# Patient Record
Sex: MALE | Race: WHITE | NOT HISPANIC OR LATINO
[De-identification: names, ages, dates, MRNs, and addresses within clinical notes are randomized per-mention and may not be internally consistent; named-entity substitution may affect disease eponyms.]

---

## 2019-06-19 ENCOUNTER — APPOINTMENT (OUTPATIENT)
Dept: CARDIOLOGY | Facility: CLINIC | Age: 34
End: 2019-06-19
Payer: COMMERCIAL

## 2019-06-19 ENCOUNTER — NON-APPOINTMENT (OUTPATIENT)
Age: 34
End: 2019-06-19

## 2019-06-19 VITALS
HEART RATE: 73 BPM | HEIGHT: 68 IN | DIASTOLIC BLOOD PRESSURE: 87 MMHG | WEIGHT: 237 LBS | SYSTOLIC BLOOD PRESSURE: 128 MMHG | BODY MASS INDEX: 35.92 KG/M2 | OXYGEN SATURATION: 97 %

## 2019-06-19 DIAGNOSIS — R07.9 CHEST PAIN, UNSPECIFIED: ICD-10-CM

## 2019-06-19 DIAGNOSIS — R06.02 SHORTNESS OF BREATH: ICD-10-CM

## 2019-06-19 PROCEDURE — 99204 OFFICE O/P NEW MOD 45 MIN: CPT

## 2019-06-19 PROCEDURE — 93000 ELECTROCARDIOGRAM COMPLETE: CPT

## 2019-06-19 NOTE — PHYSICAL EXAM
[General Appearance - Well Developed] : well developed [Normal Appearance] : normal appearance [Well Groomed] : well groomed [General Appearance - Well Nourished] : well nourished [No Deformities] : no deformities [General Appearance - In No Acute Distress] : no acute distress [Normal Conjunctiva] : the conjunctiva exhibited no abnormalities [Normal Oral Mucosa] : normal oral mucosa [Normal Jugular Venous A Waves Present] : normal jugular venous A waves present [Normal Jugular Venous V Waves Present] : normal jugular venous V waves present [No Jugular Venous Szymanski A Waves] : no jugular venous szymanski A waves [Not Palpable] : not palpable [Normal Rate] : normal [Normal S1] : normal S1 [Normal S2] : normal S2 [No Murmur] : no murmurs heard [2+] : left 2+ [No Abnormalities] : the abdominal aorta was not enlarged and no bruit was heard [No Pitting Edema] : no pitting edema present [Respiration, Rhythm And Depth] : normal respiratory rhythm and effort [Exaggerated Use Of Accessory Muscles For Inspiration] : no accessory muscle use [Auscultation Breath Sounds / Voice Sounds] : lungs were clear to auscultation bilaterally [Bowel Sounds] : normal bowel sounds [Abdomen Soft] : soft [Abdomen Tenderness] : non-tender [Abnormal Walk] : normal gait [Gait - Sufficient For Exercise Testing] : the gait was sufficient for exercise testing [Nail Clubbing] : no clubbing of the fingernails [Cyanosis, Localized] : no localized cyanosis [Skin Color & Pigmentation] : normal skin color and pigmentation [Skin Turgor] : normal skin turgor [] : no rash [Oriented To Time, Place, And Person] : oriented to person, place, and time [Impaired Insight] : insight and judgment were intact [No Anxiety] : not feeling anxious [S3] : no S3 [S4] : no S4 [Right Carotid Bruit] : no bruit heard over the right carotid [Left Carotid Bruit] : no bruit heard over the left carotid [Right Femoral Bruit] : no bruit heard over the right femoral artery [Left Femoral Bruit] : no bruit heard over the left femoral artery

## 2019-06-19 NOTE — HISTORY OF PRESENT ILLNESS
[FreeTextEntry1] : I saw Rj Murillo in the office today for cardiac evaluation. He is a 34-year-old white male who has been in good general health. He plays a lot of sports including football and hockey. He denies any exertional symptoms. He has no history of smoking, diabetes, hypertension, or hyperlipidemia. 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 He is scheduled to take a test tomorrow to join the union. 3 and 2 nights ago, he woke up from sleep after about 2 hours with a dull discomfort in his left anterior chest associated with some mild shortness of breath. The pain was not pleuritic and not sore to the touch. He did not feel better sitting up but the symptoms quickly subsided within a half minute and went back to sleep and felt well the next day. He did not have the symptoms last night..\par \par He denies any recent cold or infection. Had no cough. \par \par He takes no medication. He otherwise has been feeling healthy and normal.\par \par A resting 12-lead electrocardiogram demonstrates sinus rhythm and appears to be normal.

## 2019-06-19 NOTE — REVIEW OF SYSTEMS
[Shortness Of Breath] : shortness of breath [Chest Pain] : chest pain [Dizziness] : dizziness [Anxiety] : anxiety [Negative] : Respiratory [Fever] : no fever [Headache] : no headache [Chills] : no chills [Feeling Fatigued] : not feeling fatigued [Blurry Vision] : no blurred vision [Seeing Double (Diplopia)] : no diplopia [Palpitations] : no palpitations [Joint Pain] : no joint pain [Muscle Cramps] : no muscle cramps [Skin: A Rash] : no rash: [Depression] : no depression [Excessive Thirst] : no polydipsia [Easy Bleeding] : no tendency for easy bleeding [Easy Bruising] : no tendency for easy bruising

## 2019-06-19 NOTE — DISCUSSION/SUMMARY
[FreeTextEntry1] : Patient presents with very atypical chest pain and shortness of breath. The symptoms wake him from sleep and resolved quickly within a half a minute without any treatment. He's had the symptoms for 2 nights. During the day he is physically active and has no exertional symptoms. He's been under a lot of stress at work which is culminating in taking a test tomorrow to join the union. I suspect that most likely his symptoms are stress related. He has no risk factors for heart disease. He has a normal exam and normal ECG.\par \par I would like to review blood work from your office. Will come back for an echocardiogram to make sure the heart is structurally normal. I suspect that when the level of stress is reduced which should occur after he takes his test, that his symptoms will get better. If his symptoms persist then we may need to do other testing.\par \par We did discuss the importance of lifestyle. He exercises but needs to get his weight under better control. His wife is starting weight watcher's and he would do best getting into some controlled diet program.\par \par This was discussed with the patient and his wife and I answered all their questions.

## 2019-07-19 ENCOUNTER — APPOINTMENT (OUTPATIENT)
Dept: CARDIOLOGY | Facility: CLINIC | Age: 34
End: 2019-07-19
Payer: COMMERCIAL

## 2019-07-19 PROCEDURE — 93306 TTE W/DOPPLER COMPLETE: CPT

## 2020-09-10 ENCOUNTER — TRANSCRIPTION ENCOUNTER (OUTPATIENT)
Age: 35
End: 2020-09-10